# Patient Record
Sex: FEMALE | Race: WHITE
[De-identification: names, ages, dates, MRNs, and addresses within clinical notes are randomized per-mention and may not be internally consistent; named-entity substitution may affect disease eponyms.]

---

## 2017-10-02 ENCOUNTER — APPOINTMENT (OUTPATIENT)
Dept: DERMATOLOGY | Facility: CLINIC | Age: 57
End: 2017-10-02

## 2018-08-11 ENCOUNTER — TRANSCRIPTION ENCOUNTER (OUTPATIENT)
Age: 58
End: 2018-08-11

## 2018-08-12 ENCOUNTER — EMERGENCY (EMERGENCY)
Facility: HOSPITAL | Age: 58
LOS: 0 days | Discharge: HOME | End: 2018-08-12
Attending: EMERGENCY MEDICINE | Admitting: EMERGENCY MEDICINE
Payer: COMMERCIAL

## 2018-08-12 VITALS
SYSTOLIC BLOOD PRESSURE: 121 MMHG | HEIGHT: 69 IN | WEIGHT: 128.97 LBS | OXYGEN SATURATION: 100 % | HEART RATE: 90 BPM | RESPIRATION RATE: 18 BRPM | DIASTOLIC BLOOD PRESSURE: 100 MMHG

## 2018-08-12 DIAGNOSIS — L50.9 URTICARIA, UNSPECIFIED: ICD-10-CM

## 2018-08-12 PROCEDURE — 99284 EMERGENCY DEPT VISIT MOD MDM: CPT | Mod: 25

## 2018-08-12 RX ORDER — FAMOTIDINE 10 MG/ML
20 INJECTION INTRAVENOUS ONCE
Qty: 0 | Refills: 0 | Status: COMPLETED | OUTPATIENT
Start: 2018-08-12 | End: 2018-08-12

## 2018-08-12 RX ORDER — FAMOTIDINE 10 MG/ML
1 INJECTION INTRAVENOUS
Qty: 20 | Refills: 0 | OUTPATIENT
Start: 2018-08-12 | End: 2018-08-21

## 2018-08-12 RX ADMIN — FAMOTIDINE 20 MILLIGRAM(S): 10 INJECTION INTRAVENOUS at 04:55

## 2018-08-12 NOTE — ED PROVIDER NOTE - MEDICAL DECISION MAKING DETAILS
pt with hives itchy.  pt denies any sorethroat or sob.   pt already on medrol dose pack, benadryl.  will add pepcid and have f/u with allergist.

## 2018-08-12 NOTE — ED ADULT TRIAGE NOTE - CHIEF COMPLAINT QUOTE
Patient reports a one day history of whole body hives. Went to urgent care today who prescribed Prednisone and Zertyc, patient took these and OTC Benadryl without any relief. Patient describes the rash as highly pruritic. Negative: Angioedema, trouble swallowing, throat swelling/itching, wheezing, SOB, or CP. Unknown allergen contact.

## 2018-08-12 NOTE — ED ADULT NURSE NOTE - NSIMPLEMENTINTERV_GEN_ALL_ED
Implemented All Universal Safety Interventions:  [place] to call system. Call bell, personal items and telephone within reach. Instruct patient to call for assistance. Room bathroom lighting operational. Non-slip footwear when patient is off stretcher. Physically safe environment: no spills, clutter or unnecessary equipment. Stretcher in lowest position, wheels locked, appropriate side rails in place.

## 2018-08-12 NOTE — ED PROVIDER NOTE - OBJECTIVE STATEMENT
59 y/o female in ED c/o diffuse itchy hives x 2 days.   pt states seen at urgent care center yesterday and started on medrol dose pack and benadryl.  pt states still with itchy rash.   pt denies any fever, HA, sore throat, neck pain, cp, sob, n/v/d/abd pain.   tolerating PO.   pt denies anything new.   no h/o allergies

## 2019-10-24 NOTE — ED PROVIDER NOTE - ENMT NEGATIVE STATEMENT, MLM
7
Ears: no ear pain and no hearing problems.Nose: no nasal congestion and no nasal drainage.Mouth/Throat: no dysphagia, no hoarseness and no throat pain.Neck: no lumps, no pain, no stiffness and no swollen glands.